# Patient Record
Sex: FEMALE
[De-identification: names, ages, dates, MRNs, and addresses within clinical notes are randomized per-mention and may not be internally consistent; named-entity substitution may affect disease eponyms.]

---

## 2021-03-17 ENCOUNTER — NURSE TRIAGE (OUTPATIENT)
Dept: OTHER | Facility: CLINIC | Age: 61
End: 2021-03-17

## 2021-03-17 NOTE — TELEPHONE ENCOUNTER
Call received on [address]. No triage, information only. [facility] web site information shared with understanding. \"Yes, you should be vaccinated regardless of whether you already had COVID-19. Thats because experts do not yet know how long you are protected from getting sick again after recovering from COVID-19. Even if you have already recovered from COVID-19, it is possible--although rare--that you could be infected with the virus that causes COVID-19 again. Learn more about why getting vaccinated is a safer way to build protection than getting infected. If you were treated for COVID-19 with monoclonal antibodies or convalescent plasma, you should wait 90 days before getting a COVID-19 vaccine. Talk to your doctor if you are unsure what treatments you received or if you have more questions about getting a COVID-19 vaccine. \"    Care advice provided. Recommended using local [facility] of health website for testing locations and up to date information. Caller verbalizes understanding, denies any other questions or concerns; instructed to call back for any new or worsening symptoms. Reason for Disposition   General information question, no triage required and triager able to answer question    Answer Assessment - Initial Assessment Questions  1. REASON FOR CALL or QUESTION: \"What is your reason for calling today? \" or \"How can I best help you? \" or \"What question do you have that I can help answer? \"      How long to wait to get vaccine?     Protocols used: INFORMATION ONLY CALL - NO TRIAGE-[facility]